# Patient Record
Sex: MALE | Race: WHITE | NOT HISPANIC OR LATINO | Employment: OTHER | URBAN - NONMETROPOLITAN AREA
[De-identification: names, ages, dates, MRNs, and addresses within clinical notes are randomized per-mention and may not be internally consistent; named-entity substitution may affect disease eponyms.]

---

## 2020-01-24 ENCOUNTER — PROCEDURE VISIT (OUTPATIENT)
Dept: UROLOGY | Facility: CLINIC | Age: 75
End: 2020-01-24

## 2020-01-24 DIAGNOSIS — Z85.51 HISTORY OF BLADDER CANCER: ICD-10-CM

## 2020-01-24 DIAGNOSIS — N40.0 BENIGN PROSTATIC HYPERPLASIA, UNSPECIFIED WHETHER LOWER URINARY TRACT SYMPTOMS PRESENT: Primary | ICD-10-CM

## 2020-01-24 LAB
BILIRUB BLD-MCNC: NEGATIVE MG/DL
CLARITY, POC: CLEAR
COLOR UR: YELLOW
GLUCOSE UR STRIP-MCNC: NEGATIVE MG/DL
KETONES UR QL: NEGATIVE
LEUKOCYTE EST, POC: NEGATIVE
NITRITE UR-MCNC: NEGATIVE MG/ML
PH UR: 5.5 [PH] (ref 5–8)
PROT UR STRIP-MCNC: NEGATIVE MG/DL
RBC # UR STRIP: NEGATIVE /UL
SP GR UR: 1.02 (ref 1–1.03)
UROBILINOGEN UR QL: NORMAL

## 2020-01-24 PROCEDURE — 81002 URINALYSIS NONAUTO W/O SCOPE: CPT | Performed by: UROLOGY

## 2020-01-24 PROCEDURE — 52000 CYSTOURETHROSCOPY: CPT | Performed by: UROLOGY

## 2020-01-24 NOTE — PROGRESS NOTES
Pre- operative diagnosis:  Bladder cancer surveillance.  Patient known to me from [facility] for history of low-grade superficial TCC bladder dating back to 2011.  Last TURBT February 2019 at time of TURP.  He is voiding well off prostate meds.  He presents today for 6-month surveillance cystoscopy.    Post operative diagnosis:  No recurrent bladder tumor    Procedure:  The patient was prepped and draped in a normal sterile fashion.  The urethra was anesthetized with 2% lidocaine jelly.  A flexible cystoscope was introduced per urethra.      Urethra:  Normal    Bladder:  There is no evidence of a stone, foreign body or mass within the bladder.  The bladder is minimally trabeculated.  The bladder neck is without contracture., Normal mucosa, Bladder stone and No bladder tumor    Ureteral orifices:  Normal position bilaterally and Clear efflux bilaterally    Prostate:  Well resected prostatic urethra     Patient tolerated the procedure well    Complications: none    Blood loss: minimal    Follow up:    Routine follow up-surveillance cystoscopy in the clinic in 6 months or sooner as needed.

## 2020-07-27 ENCOUNTER — PROCEDURE VISIT (OUTPATIENT)
Dept: UROLOGY | Facility: CLINIC | Age: 75
End: 2020-07-27

## 2020-07-27 DIAGNOSIS — N40.0 BENIGN PROSTATIC HYPERPLASIA, UNSPECIFIED WHETHER LOWER URINARY TRACT SYMPTOMS PRESENT: Primary | ICD-10-CM

## 2020-07-27 LAB
BILIRUB BLD-MCNC: NEGATIVE MG/DL
CLARITY, POC: CLEAR
COLOR UR: YELLOW
GLUCOSE UR STRIP-MCNC: NEGATIVE MG/DL
KETONES UR QL: NEGATIVE
LEUKOCYTE EST, POC: NEGATIVE
NITRITE UR-MCNC: NEGATIVE MG/ML
PH UR: 6 [PH] (ref 5–8)
PROT UR STRIP-MCNC: ABNORMAL MG/DL
RBC # UR STRIP: NEGATIVE /UL
SP GR UR: 1.02 (ref 1–1.03)
UROBILINOGEN UR QL: NORMAL

## 2020-07-27 PROCEDURE — 81003 URINALYSIS AUTO W/O SCOPE: CPT | Performed by: UROLOGY

## 2020-07-27 PROCEDURE — 52000 CYSTOURETHROSCOPY: CPT | Performed by: UROLOGY

## 2020-07-27 NOTE — PROGRESS NOTES
Pre- operative diagnosis:  Bladder cancer surveillance.  History of low-grade superficial TCC bladder dating back to 2011.  Last TURBT February 2019 at time of TURP.  He is voiding well off prostate meds.  He presents today for 6-month surveillance cystoscopy    Post operative diagnosis:  No recurrent bladder tumor    Procedure:  The patient was prepped and draped in a normal sterile fashion.  The urethra was anesthetized with 2% lidocaine jelly.  A flexible cystoscope was introduced per urethra.      Urethra:  Normal    Bladder:  Normal mucosa, No bladder tumor and No bladder neck contracture    Ureteral orifices:  Normal position bilaterally and Clear efflux bilaterally    Prostate:  Well resected prostatic urethra     Patient tolerated the procedure well    Complications: none    Blood loss: minimal    Follow up:    Routine follow up-office cystoscopy in 6 to 12 months for surveillance.      This document has been signed by JOANNA Romeo MD on July 27, 2020 12:04

## 2021-03-01 ENCOUNTER — PROCEDURE VISIT (OUTPATIENT)
Dept: UROLOGY | Facility: CLINIC | Age: 76
End: 2021-03-01

## 2021-03-01 DIAGNOSIS — N40.0 BENIGN PROSTATIC HYPERPLASIA, UNSPECIFIED WHETHER LOWER URINARY TRACT SYMPTOMS PRESENT: Primary | ICD-10-CM

## 2021-03-01 DIAGNOSIS — N52.1 ERECTILE DYSFUNCTION DUE TO DISEASES CLASSIFIED ELSEWHERE: ICD-10-CM

## 2021-03-01 LAB
BILIRUB BLD-MCNC: NEGATIVE MG/DL
CLARITY, POC: CLEAR
COLOR UR: YELLOW
GLUCOSE UR STRIP-MCNC: ABNORMAL MG/DL
KETONES UR QL: NEGATIVE
LEUKOCYTE EST, POC: NEGATIVE
NITRITE UR-MCNC: NEGATIVE MG/ML
PH UR: 6 [PH] (ref 5–8)
PROT UR STRIP-MCNC: ABNORMAL MG/DL
RBC # UR STRIP: NEGATIVE /UL
SP GR UR: 1.02 (ref 1–1.03)
UROBILINOGEN UR QL: NORMAL

## 2021-03-01 PROCEDURE — 52000 CYSTOURETHROSCOPY: CPT | Performed by: UROLOGY

## 2021-03-01 PROCEDURE — 81003 URINALYSIS AUTO W/O SCOPE: CPT | Performed by: UROLOGY

## 2021-03-01 RX ORDER — SILDENAFIL CITRATE 20 MG/1
TABLET ORAL
Qty: 20 TABLET | Refills: 11 | Status: SHIPPED | OUTPATIENT
Start: 2021-03-01

## 2021-03-01 NOTE — PROGRESS NOTES
Pre- operative diagnosis:  Bladder cancer surveillance.  History of low-grade superficial TCC bladder dating back to 2011.  Last TURBT February 2019 at time of TURP.  He is voiding well off prostate meds.      Post operative diagnosis:  No recurrent bladder tumor    Procedure:  The patient was prepped and draped in a normal sterile fashion.  The urethra was anesthetized with 2% lidocaine jelly.  A flexible cystoscope was introduced per urethra.      Urethra:  Normal    Bladder:  Normal mucosa, No bladder tumor and No bladder neck contracture    Ureteral orifices:  Normal position bilaterally and Clear efflux bilaterally    Prostate:  Well resected prostatic urethra     Patient tolerated the procedure well    Complications: none    Blood loss: minimal    Follow up:    Routine follow up-1 year for surveillance cystoscopy.    We would like a trial of PDE inhibitor therapy for his erectile dysfunction.  He will follow-up with me sooner as needed to discuss other treatments for his ED.      This document has been signed by JOANNA Romeo MD on March 1, 2021 13:00 CST

## 2022-03-02 ENCOUNTER — PROCEDURE VISIT (OUTPATIENT)
Dept: UROLOGY | Facility: CLINIC | Age: 77
End: 2022-03-02

## 2022-03-02 DIAGNOSIS — C67.3 CANCER OF ANTERIOR WALL OF URINARY BLADDER: Primary | ICD-10-CM

## 2022-03-02 DIAGNOSIS — N40.0 BENIGN PROSTATIC HYPERPLASIA, UNSPECIFIED WHETHER LOWER URINARY TRACT SYMPTOMS PRESENT: ICD-10-CM

## 2022-03-02 LAB
BILIRUB BLD-MCNC: ABNORMAL MG/DL
CLARITY, POC: CLEAR
COLOR UR: ABNORMAL
GLUCOSE UR STRIP-MCNC: ABNORMAL MG/DL
KETONES UR QL: ABNORMAL
LEUKOCYTE EST, POC: NEGATIVE
NITRITE UR-MCNC: NEGATIVE MG/ML
PH UR: 5 [PH] (ref 5–8)
PROT UR STRIP-MCNC: ABNORMAL MG/DL
RBC # UR STRIP: NEGATIVE /UL
SP GR UR: 1.02 (ref 1–1.03)
UROBILINOGEN UR QL: NORMAL

## 2022-03-02 PROCEDURE — 81001 URINALYSIS AUTO W/SCOPE: CPT | Performed by: UROLOGY

## 2022-03-02 PROCEDURE — 52000 CYSTOURETHROSCOPY: CPT | Performed by: UROLOGY

## 2022-03-03 NOTE — PROGRESS NOTES
Pre- operative diagnosis:  Bladder cancer surveillance.  History of low-grade superficial TCC bladder dating back to 2011.  Last TURBT February 2019 at time of TURP.  He is voiding well off prostate meds but is having some recurrent issues with dribbling.      Post operative diagnosis:  Bladder Tumor    Procedure:  The patient was prepped and draped in a normal sterile fashion.  The urethra was anesthetized with 2% lidocaine jelly.  A flexible cystoscope was introduced per urethra.      Urethra:  Normal    Bladder:  No bladder tumor.  1 cm papillary bladder tumor at bladder neck    Ureteral orifices:  Normal position bilaterally and Clear efflux bilaterally    Prostate:  Well resected prostatic urethra     Patient tolerated the procedure well    Complications: none    Blood loss: minimal    Follow up:    Schedule for OR  TURBT, bilateral retrograde pyelography, intravesical gemcitabine on 3/23/2022.  We discussed risks of the procedure including but not limited to infection, bleeding, need for additional procedures, persistent/recurrent cancer, injury to urethra and/or bladder, need for urethral catheter, complications of anesthesia.  Patient voices understanding and provide informed consent to proceed.      This document has been signed by JOANNA Romeo MD on March 2, 2022 18:51 CST

## 2022-03-25 ENCOUNTER — TELEPHONE (OUTPATIENT)
Dept: UROLOGY | Facility: CLINIC | Age: 77
End: 2022-03-25

## 2022-03-25 ENCOUNTER — LAB (OUTPATIENT)
Dept: LAB | Facility: HOSPITAL | Age: 77
End: 2022-03-25

## 2022-03-25 ENCOUNTER — PRE-ADMISSION TESTING (OUTPATIENT)
Dept: PREADMISSION TESTING | Facility: HOSPITAL | Age: 77
End: 2022-03-25

## 2022-03-25 VITALS
SYSTOLIC BLOOD PRESSURE: 185 MMHG | RESPIRATION RATE: 20 BRPM | BODY MASS INDEX: 34.08 KG/M2 | OXYGEN SATURATION: 96 % | HEART RATE: 81 BPM | HEIGHT: 67 IN | WEIGHT: 217.15 LBS | DIASTOLIC BLOOD PRESSURE: 81 MMHG

## 2022-03-25 DIAGNOSIS — C67.3 CANCER OF ANTERIOR WALL OF URINARY BLADDER: ICD-10-CM

## 2022-03-25 LAB — SARS-COV-2 ORF1AB RESP QL NAA+PROBE: NOT DETECTED

## 2022-03-25 PROCEDURE — 85027 COMPLETE CBC AUTOMATED: CPT | Performed by: UROLOGY

## 2022-03-25 PROCEDURE — U0004 COV-19 TEST NON-CDC HGH THRU: HCPCS

## 2022-03-25 PROCEDURE — U0005 INFEC AGEN DETEC AMPLI PROBE: HCPCS

## 2022-03-25 PROCEDURE — 81003 URINALYSIS AUTO W/O SCOPE: CPT | Performed by: UROLOGY

## 2022-03-25 PROCEDURE — 80048 BASIC METABOLIC PNL TOTAL CA: CPT | Performed by: UROLOGY

## 2022-03-25 PROCEDURE — 93005 ELECTROCARDIOGRAM TRACING: CPT | Performed by: UROLOGY

## 2022-03-25 PROCEDURE — C9803 HOPD COVID-19 SPEC COLLECT: HCPCS

## 2022-03-25 PROCEDURE — 93010 ELECTROCARDIOGRAM REPORT: CPT | Performed by: INTERNAL MEDICINE

## 2022-03-25 RX ORDER — LISINOPRIL 40 MG/1
40 TABLET ORAL DAILY
COMMUNITY

## 2022-03-25 RX ORDER — COLCHICINE 0.6 MG/1
0.6 CAPSULE ORAL AS NEEDED
COMMUNITY

## 2022-03-25 RX ORDER — CLOPIDOGREL BISULFATE 75 MG/1
75 TABLET ORAL DAILY
COMMUNITY
End: 2022-03-28 | Stop reason: HOSPADM

## 2022-03-25 RX ORDER — PANTOPRAZOLE SODIUM 40 MG/1
40 TABLET, DELAYED RELEASE ORAL DAILY
COMMUNITY

## 2022-03-25 RX ORDER — LANOLIN ALCOHOL/MO/W.PET/CERES
50 CREAM (GRAM) TOPICAL DAILY
COMMUNITY

## 2022-03-25 RX ORDER — FOLIC ACID 1 MG/1
1 TABLET ORAL DAILY
COMMUNITY

## 2022-03-25 RX ORDER — FEBUXOSTAT 40 MG/1
40 TABLET, FILM COATED ORAL DAILY
COMMUNITY

## 2022-03-25 RX ORDER — LANOLIN ALCOHOL/MO/W.PET/CERES
1000 CREAM (GRAM) TOPICAL DAILY
COMMUNITY

## 2022-03-25 RX ORDER — GABAPENTIN 600 MG/1
600 TABLET ORAL DAILY
COMMUNITY

## 2022-03-25 RX ORDER — ATORVASTATIN CALCIUM 20 MG/1
20 TABLET, FILM COATED ORAL DAILY
COMMUNITY

## 2022-03-25 RX ORDER — GLIPIZIDE 5 MG/1
5 TABLET ORAL
COMMUNITY

## 2022-03-25 NOTE — DISCHARGE INSTRUCTIONS
Before you come to the hospital      Do not eat, drink, smoke, or chew gum after midnight the night before surgery. This includes no mints.    Arrival time: AS DIRECTED BY OFFICE     Only one family member or friend will be allowed per patient      YOU MAY TAKE THE FOLLOWING MEDICATION(S) THE MORNING OF SURGERY WITH A SIP OF WATER: GABAPENTIN    DO NOT TAKE LISINOPRIL 24 HOURS PRIOR TO  SURGERY         ALL OTHER HOME MEDICATION CHECK WITH YOUR PHYSICIAN                            (especially if you are taking diabetes medicines or blood thinners)     Do not take any Erectile Dysfunction medications (EX: CIALIS, VIAGRA) 24 hours prior to surgery      If you were given and instructed to use a germ- killing soap, use as directed the night before surgery and the morning of surgery before coming to the hospital.                 MANAGING PAIN AFTER SURGERY    We know you are probably wondering what your pain will be like after surgery.  Following surgery it is unrealistic to expect you will not have pain.   Pain is how our bodies let us know that something is wrong or cautions us to be careful.  That said, our goal is to make your pain tolerable.    Methods we may use to treat your pain include (oral or IV medications, PCAs, epidurals, nerve blocks, etc.)   While some procedures require IV pain medications for a short time after surgery, transitioning to pain medications by mouth allows for better management of pain.   Your nurse will encourage you to take oral pain medications whenever possible.  IV medications work almost immediately, but only last a short while.  Taking medications by mouth allows for a more constant level of medication in your blood stream for a longer period of time.      Once your pain is out of control it is harder to get back under control.  It is important you are aware when your next dose of pain medication is due.  If you are admitted, your nurse may write the time of your next dose on the  white board in your room to help you remember.      We are interested in your pain and encourage you to inform us about aggravating factors during your visit.   Many times a simple repositioning every few hours can make a big difference.    If your physician says it is okay, do not let your pain prevent you from getting out of bed. Be sure to call your nurse for assistance prior to getting up so you do not fall.      Before surgery, please decide your tolerable pain goal.  These faces help describe the pain ratings we use on a 0-10 scale.   Be prepared to tell us your goal and whether or not you take pain or anxiety medications at home.

## 2022-03-26 LAB
QT INTERVAL: 412 MS
QTC INTERVAL: 428 MS

## 2022-03-28 ENCOUNTER — ANESTHESIA (OUTPATIENT)
Dept: PERIOP | Facility: HOSPITAL | Age: 77
End: 2022-03-28

## 2022-03-28 ENCOUNTER — HOSPITAL ENCOUNTER (OUTPATIENT)
Facility: HOSPITAL | Age: 77
Setting detail: HOSPITAL OUTPATIENT SURGERY
Discharge: HOME | End: 2022-03-28
Attending: UROLOGY | Admitting: UROLOGY

## 2022-03-28 ENCOUNTER — APPOINTMENT (OUTPATIENT)
Dept: GENERAL RADIOLOGY | Facility: HOSPITAL | Age: 77
End: 2022-03-28

## 2022-03-28 ENCOUNTER — ANESTHESIA EVENT (OUTPATIENT)
Dept: PERIOP | Facility: HOSPITAL | Age: 77
End: 2022-03-28

## 2022-03-28 VITALS
SYSTOLIC BLOOD PRESSURE: 177 MMHG | RESPIRATION RATE: 16 BRPM | HEART RATE: 65 BPM | DIASTOLIC BLOOD PRESSURE: 89 MMHG | OXYGEN SATURATION: 93 % | TEMPERATURE: 97.3 F

## 2022-03-28 DIAGNOSIS — C67.3 CANCER OF ANTERIOR WALL OF URINARY BLADDER: ICD-10-CM

## 2022-03-28 LAB
GLUCOSE BLDC GLUCOMTR-MCNC: 183 MG/DL (ref 70–130)
GLUCOSE BLDC GLUCOMTR-MCNC: 197 MG/DL (ref 70–130)

## 2022-03-28 PROCEDURE — 88307 TISSUE EXAM BY PATHOLOGIST: CPT | Performed by: UROLOGY

## 2022-03-28 PROCEDURE — 74420 UROGRAPHY RTRGR +-KUB: CPT | Performed by: UROLOGY

## 2022-03-28 PROCEDURE — 25010000002 FENTANYL CITRATE (PF) 100 MCG/2ML SOLUTION: Performed by: NURSE ANESTHETIST, CERTIFIED REGISTERED

## 2022-03-28 PROCEDURE — 25010000002 IOPAMIDOL 61 % SOLUTION: Performed by: UROLOGY

## 2022-03-28 PROCEDURE — 25010000002 PROPOFOL 10 MG/ML EMULSION: Performed by: NURSE ANESTHETIST, CERTIFIED REGISTERED

## 2022-03-28 PROCEDURE — 52234 CYSTOSCOPY AND TREATMENT: CPT | Performed by: UROLOGY

## 2022-03-28 PROCEDURE — 51720 TREATMENT OF BLADDER LESION: CPT | Performed by: UROLOGY

## 2022-03-28 PROCEDURE — 25010000002 LEVOFLOXACIN PER 250 MG: Performed by: UROLOGY

## 2022-03-28 PROCEDURE — C1758 CATHETER, URETERAL: HCPCS | Performed by: UROLOGY

## 2022-03-28 PROCEDURE — 52005 CYSTO W/URTRL CATHJ: CPT | Performed by: UROLOGY

## 2022-03-28 PROCEDURE — 74420 UROGRAPHY RTRGR +-KUB: CPT

## 2022-03-28 PROCEDURE — 82962 GLUCOSE BLOOD TEST: CPT

## 2022-03-28 PROCEDURE — 25010000002 ONDANSETRON PER 1 MG: Performed by: NURSE ANESTHETIST, CERTIFIED REGISTERED

## 2022-03-28 RX ORDER — PROPOFOL 10 MG/ML
VIAL (ML) INTRAVENOUS AS NEEDED
Status: DISCONTINUED | OUTPATIENT
Start: 2022-03-28 | End: 2022-03-28 | Stop reason: SURG

## 2022-03-28 RX ORDER — DEXTROSE MONOHYDRATE 25 G/50ML
12.5 INJECTION, SOLUTION INTRAVENOUS AS NEEDED
Status: DISCONTINUED | OUTPATIENT
Start: 2022-03-28 | End: 2022-03-28 | Stop reason: HOSPADM

## 2022-03-28 RX ORDER — FLUMAZENIL 0.1 MG/ML
0.2 INJECTION INTRAVENOUS AS NEEDED
Status: DISCONTINUED | OUTPATIENT
Start: 2022-03-28 | End: 2022-03-28 | Stop reason: HOSPADM

## 2022-03-28 RX ORDER — MAGNESIUM HYDROXIDE 1200 MG/15ML
LIQUID ORAL AS NEEDED
Status: DISCONTINUED | OUTPATIENT
Start: 2022-03-28 | End: 2022-03-28 | Stop reason: HOSPADM

## 2022-03-28 RX ORDER — DOCUSATE SODIUM 100 MG/1
100 CAPSULE, LIQUID FILLED ORAL 2 TIMES DAILY
Qty: 60 CAPSULE | Refills: 1 | Status: SHIPPED | OUTPATIENT
Start: 2022-03-28

## 2022-03-28 RX ORDER — LIDOCAINE HYDROCHLORIDE 10 MG/ML
0.5 INJECTION, SOLUTION EPIDURAL; INFILTRATION; INTRACAUDAL; PERINEURAL ONCE AS NEEDED
Status: DISCONTINUED | OUTPATIENT
Start: 2022-03-28 | End: 2022-03-28 | Stop reason: HOSPADM

## 2022-03-28 RX ORDER — SODIUM CHLORIDE, SODIUM LACTATE, POTASSIUM CHLORIDE, CALCIUM CHLORIDE 600; 310; 30; 20 MG/100ML; MG/100ML; MG/100ML; MG/100ML
1000 INJECTION, SOLUTION INTRAVENOUS CONTINUOUS
Status: DISCONTINUED | OUTPATIENT
Start: 2022-03-28 | End: 2022-03-28 | Stop reason: HOSPADM

## 2022-03-28 RX ORDER — FENTANYL CITRATE 50 UG/ML
INJECTION, SOLUTION INTRAMUSCULAR; INTRAVENOUS AS NEEDED
Status: DISCONTINUED | OUTPATIENT
Start: 2022-03-28 | End: 2022-03-28 | Stop reason: SURG

## 2022-03-28 RX ORDER — SULFAMETHOXAZOLE AND TRIMETHOPRIM 800; 160 MG/1; MG/1
1 TABLET ORAL 2 TIMES DAILY
Qty: 6 TABLET | Refills: 0 | Status: SHIPPED | OUTPATIENT
Start: 2022-03-28 | End: 2022-03-31

## 2022-03-28 RX ORDER — ONDANSETRON 4 MG/1
4 TABLET, ORALLY DISINTEGRATING ORAL EVERY 6 HOURS PRN
Qty: 6 TABLET | Refills: 1 | Status: SHIPPED | OUTPATIENT
Start: 2022-03-28

## 2022-03-28 RX ORDER — LIDOCAINE HYDROCHLORIDE 20 MG/ML
INJECTION, SOLUTION EPIDURAL; INFILTRATION; INTRACAUDAL; PERINEURAL AS NEEDED
Status: DISCONTINUED | OUTPATIENT
Start: 2022-03-28 | End: 2022-03-28 | Stop reason: SURG

## 2022-03-28 RX ORDER — FENTANYL CITRATE 50 UG/ML
25 INJECTION, SOLUTION INTRAMUSCULAR; INTRAVENOUS
Status: DISCONTINUED | OUTPATIENT
Start: 2022-03-28 | End: 2022-03-28 | Stop reason: HOSPADM

## 2022-03-28 RX ORDER — LEVOFLOXACIN 5 MG/ML
500 INJECTION, SOLUTION INTRAVENOUS ONCE
Status: COMPLETED | OUTPATIENT
Start: 2022-03-28 | End: 2022-03-28

## 2022-03-28 RX ORDER — OXYBUTYNIN CHLORIDE 5 MG/1
5 TABLET ORAL EVERY 8 HOURS PRN
Qty: 20 TABLET | Refills: 1 | Status: SHIPPED | OUTPATIENT
Start: 2022-03-28

## 2022-03-28 RX ORDER — SODIUM CHLORIDE 0.9 % (FLUSH) 0.9 %
10 SYRINGE (ML) INJECTION EVERY 12 HOURS SCHEDULED
Status: DISCONTINUED | OUTPATIENT
Start: 2022-03-28 | End: 2022-03-28 | Stop reason: HOSPADM

## 2022-03-28 RX ORDER — IBUPROFEN 600 MG/1
600 TABLET ORAL ONCE AS NEEDED
Status: DISCONTINUED | OUTPATIENT
Start: 2022-03-28 | End: 2022-03-28 | Stop reason: HOSPADM

## 2022-03-28 RX ORDER — DROPERIDOL 2.5 MG/ML
0.62 INJECTION, SOLUTION INTRAMUSCULAR; INTRAVENOUS ONCE AS NEEDED
Status: DISCONTINUED | OUTPATIENT
Start: 2022-03-28 | End: 2022-03-28 | Stop reason: HOSPADM

## 2022-03-28 RX ORDER — LABETALOL HYDROCHLORIDE 5 MG/ML
5 INJECTION, SOLUTION INTRAVENOUS
Status: DISCONTINUED | OUTPATIENT
Start: 2022-03-28 | End: 2022-03-28 | Stop reason: HOSPADM

## 2022-03-28 RX ORDER — ONDANSETRON 2 MG/ML
4 INJECTION INTRAMUSCULAR; INTRAVENOUS ONCE AS NEEDED
Status: DISCONTINUED | OUTPATIENT
Start: 2022-03-28 | End: 2022-03-28 | Stop reason: HOSPADM

## 2022-03-28 RX ORDER — TRAMADOL HYDROCHLORIDE 50 MG/1
50 TABLET ORAL EVERY 8 HOURS PRN
Qty: 6 TABLET | Refills: 0 | Status: SHIPPED | OUTPATIENT
Start: 2022-03-28

## 2022-03-28 RX ORDER — PHENAZOPYRIDINE HYDROCHLORIDE 100 MG/1
100 TABLET, FILM COATED ORAL 3 TIMES DAILY PRN
Qty: 20 TABLET | Refills: 1 | Status: SHIPPED | OUTPATIENT
Start: 2022-03-28

## 2022-03-28 RX ORDER — SODIUM CHLORIDE, SODIUM LACTATE, POTASSIUM CHLORIDE, CALCIUM CHLORIDE 600; 310; 30; 20 MG/100ML; MG/100ML; MG/100ML; MG/100ML
9 INJECTION, SOLUTION INTRAVENOUS CONTINUOUS
Status: DISCONTINUED | OUTPATIENT
Start: 2022-03-28 | End: 2022-03-28 | Stop reason: HOSPADM

## 2022-03-28 RX ORDER — OXYCODONE AND ACETAMINOPHEN 7.5; 325 MG/1; MG/1
2 TABLET ORAL EVERY 4 HOURS PRN
Status: DISCONTINUED | OUTPATIENT
Start: 2022-03-28 | End: 2022-03-28 | Stop reason: HOSPADM

## 2022-03-28 RX ORDER — SODIUM CHLORIDE 0.9 % (FLUSH) 0.9 %
3 SYRINGE (ML) INJECTION AS NEEDED
Status: DISCONTINUED | OUTPATIENT
Start: 2022-03-28 | End: 2022-03-28 | Stop reason: HOSPADM

## 2022-03-28 RX ORDER — NALOXONE HCL 0.4 MG/ML
0.4 VIAL (ML) INJECTION AS NEEDED
Status: DISCONTINUED | OUTPATIENT
Start: 2022-03-28 | End: 2022-03-28 | Stop reason: HOSPADM

## 2022-03-28 RX ORDER — OXYCODONE AND ACETAMINOPHEN 10; 325 MG/1; MG/1
1 TABLET ORAL ONCE AS NEEDED
Status: DISCONTINUED | OUTPATIENT
Start: 2022-03-28 | End: 2022-03-28 | Stop reason: HOSPADM

## 2022-03-28 RX ORDER — ROCURONIUM BROMIDE 10 MG/ML
INJECTION, SOLUTION INTRAVENOUS AS NEEDED
Status: DISCONTINUED | OUTPATIENT
Start: 2022-03-28 | End: 2022-03-28 | Stop reason: SURG

## 2022-03-28 RX ORDER — SODIUM CHLORIDE 0.9 % (FLUSH) 0.9 %
10 SYRINGE (ML) INJECTION AS NEEDED
Status: DISCONTINUED | OUTPATIENT
Start: 2022-03-28 | End: 2022-03-28 | Stop reason: HOSPADM

## 2022-03-28 RX ORDER — ATROPA BELLADONNA AND OPIUM 16.2; 3 MG/1; MG/1
SUPPOSITORY RECTAL
Status: DISCONTINUED
Start: 2022-03-28 | End: 2022-03-28 | Stop reason: HOSPADM

## 2022-03-28 RX ORDER — ONDANSETRON 2 MG/ML
INJECTION INTRAMUSCULAR; INTRAVENOUS AS NEEDED
Status: DISCONTINUED | OUTPATIENT
Start: 2022-03-28 | End: 2022-03-28 | Stop reason: SURG

## 2022-03-28 RX ORDER — TRAMADOL HYDROCHLORIDE 50 MG/1
50 TABLET ORAL ONCE AS NEEDED
Status: DISCONTINUED | OUTPATIENT
Start: 2022-03-28 | End: 2022-03-28 | Stop reason: HOSPADM

## 2022-03-28 RX ORDER — NEOSTIGMINE METHYLSULFATE 5 MG/5 ML
SYRINGE (ML) INTRAVENOUS AS NEEDED
Status: DISCONTINUED | OUTPATIENT
Start: 2022-03-28 | End: 2022-03-28 | Stop reason: SURG

## 2022-03-28 RX ADMIN — VASOPRESSIN 1 ML: 20 INJECTION INTRAVENOUS at 13:18

## 2022-03-28 RX ADMIN — SODIUM CHLORIDE, POTASSIUM CHLORIDE, SODIUM LACTATE AND CALCIUM CHLORIDE 1000 ML: 600; 310; 30; 20 INJECTION, SOLUTION INTRAVENOUS at 10:48

## 2022-03-28 RX ADMIN — ROCURONIUM BROMIDE 30 MG: 10 SOLUTION INTRAVENOUS at 13:10

## 2022-03-28 RX ADMIN — LIDOCAINE HYDROCHLORIDE 100 MG: 20 INJECTION, SOLUTION EPIDURAL; INFILTRATION; INTRACAUDAL; PERINEURAL at 13:10

## 2022-03-28 RX ADMIN — PROPOFOL 150 MG: 10 INJECTION, EMULSION INTRAVENOUS at 13:10

## 2022-03-28 RX ADMIN — FENTANYL CITRATE 50 MCG: 50 INJECTION, SOLUTION INTRAMUSCULAR; INTRAVENOUS at 13:14

## 2022-03-28 RX ADMIN — LEVOFLOXACIN 500 MG: 5 INJECTION, SOLUTION INTRAVENOUS at 13:05

## 2022-03-28 RX ADMIN — LEVOFLOXACIN 500 MG: 5 INJECTION, SOLUTION INTRAVENOUS at 12:29

## 2022-03-28 RX ADMIN — VASOPRESSIN 1 ML: 20 INJECTION INTRAVENOUS at 13:30

## 2022-03-28 RX ADMIN — VASOPRESSIN 1 ML: 20 INJECTION INTRAVENOUS at 13:22

## 2022-03-28 RX ADMIN — Medication 3 MG: at 13:35

## 2022-03-28 RX ADMIN — OXYCODONE HYDROCHLORIDE AND ACETAMINOPHEN 2 TABLET: 7.5; 325 TABLET ORAL at 13:56

## 2022-03-28 RX ADMIN — GLYCOPYRROLATE 0.4 MG: 0.2 INJECTION, SOLUTION INTRAMUSCULAR; INTRAVENOUS at 13:35

## 2022-03-28 RX ADMIN — ONDANSETRON 4 MG: 2 INJECTION INTRAMUSCULAR; INTRAVENOUS at 13:35

## 2022-03-28 RX ADMIN — FENTANYL CITRATE 50 MCG: 50 INJECTION, SOLUTION INTRAMUSCULAR; INTRAVENOUS at 13:10

## 2022-03-28 NOTE — OP NOTE
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 Cystoscopy: 1 cm papillary bladder tumor anterior bladder neck completely resected  #2. Interpretation of retrograde pyelogram(s): The retrograde pyelogram(s) interpretation(s) is/are dictated per separate report under \"Brief op note\" document type.        Procedure details:  The patient is identified in the preoperative holding area.  The informed consent process had been completed In the office documented by my last progress note that included a discussion of the risks of this procedure, alternative management options, and the potential benefits of this procedure.  The patient voiced a good recollection of that discussion.  The patient voiced no additional questions.     The patient is taken to the cystoscopic suite and given effective general anesthesia. The patient is then placed in theBody position: dorsal lithotomy position with care being placed to appropriately pad the patient to avoid excessive compression with the Robin stirrups, especially laterally to the leg on the peroneal nerve.  The patient is then prepped and draped in the usual sterile fashion.  At this point appropriate timeout protocol was observed.    A 23 Japanese cystoscope sheath with 30° lens is inserted into " the bladder.  I also used a 70° lens to carry out a panendoscopic inspection of the urinary bladder.  Findings are described above.     Bilateral retrograde ureteropyelograms are then carried out with a 5 Georgian cone tip catheter using diluted contrast with spot fluoroscopic images taken. Findings are described per separate report.     The previously described bladder tumor(s) as described is then addressed.  I removed the cystoscope and placed a 26 Congolese continuous-flow resectoscope with visual obturator in the bladder.  I then removed the obturator and placed the resecting element with loop electrode.  I then performed transurethral resection of bladder tumor of the 1 cm papillary bladder neck tumor along the anterior bladder neck.  Hemostasis was obtained electrocautery.  Bladder tumor chips were removed and passed off the table as a specimen.  Hemostasis appeared intact.  I then removed the resectoscope and placed a 20 Congolese three-way Al catheter bladder with 10 cc of sterile water in the balloon.  Bladder was drained and then I placed a solution of gemcitabine 2000 mg into the bladder for immediate intravesical chemotherapy.  The catheter was plugged for 1 hour dwell time.  Patient was awakened from anesthesia and taken to the recovery in stable condition.  .     Estimated Blood Loss: Minimal    Specimens:                Specimens     ID Source Type Tests Collected By Collected At Frozen?    A Urinary Bladder Tissue · TISSUE PATHOLOGY EXAM   Farshad Romeo MD 3/28/22 1332     Description: bladder tumor     This specimen was not marked as sent.            Drains:   Urethral Catheter Latex 20 Fr. (Active)   Daily Indications Placement by  physician 03/28/22 1343   Collection Container Other (Comment) 03/28/22 1343   Securement Method Tape 03/28/22 1343       Complications: none    Plan: Patient will remove his Al catheter home tomorrow morning.  I will call him with his pathology result.    Farshad  Nigel Romeo MD     Date: 3/28/2022  Time: 14:00 CDT

## 2022-03-28 NOTE — ANESTHESIA PREPROCEDURE EVALUATION
Anesthesia Evaluation     Patient summary reviewed   NPO Solid Status: > 8 hours             Airway   Mallampati: II  Dental    (+) upper dentures and lower dentures    Pulmonary    (-) COPD, asthma, sleep apnea, not a smoker  Cardiovascular   Exercise tolerance: good (4-7 METS)    (+) hypertension, CAD, cardiac stents more than 12 months ago hyperlipidemia,   (-) pacemaker, past MI, angina      Neuro/Psych  (+) CVA,    (-) seizures, TIA  GI/Hepatic/Renal/Endo    (+) obesity,   renal disease CRI, diabetes mellitus,   (-) GERD, liver disease    Musculoskeletal     Abdominal    Substance History      OB/GYN          Other                        Anesthesia Plan    ASA 3     general     intravenous induction     Anesthetic plan, all risks, benefits, and alternatives have been provided, discussed and informed consent has been obtained with: patient.        CODE STATUS:

## 2022-03-28 NOTE — ANESTHESIA POSTPROCEDURE EVALUATION
Patient: Live Blackwood    Procedure Summary     Date: 03/28/22 Room / Location:  [facility] OR  /  [facility]    Anesthesia Start: 1305 Anesthesia Stop: 1344    Procedures:       CYSTOSCOPY TRANSURETHRAL RESECTION OF BLADDER TUMOR, BILATERAL RETROGRADE PYELOGRAPHY, INTRAVESICAL GEMCITABINE (N/A Bladder)      BILATERAL RETROGRADE PYELOGRAPHY (N/A Bladder)      INTRAVESICAL GEMCITABINE (N/A Bladder) Diagnosis:       Cancer of anterior wall of urinary bladder (HCC)      (Cancer of anterior wall of urinary bladder (HCC) [C67.3])    Surgeons: Farshad Romeo MD Provider: Tata Ortega CRNA    Anesthesia Type: general ASA Status: 3          Anesthesia Type: general    Vitals  Vitals Value Taken Time   /82 03/28/22 1418   Temp 97.3 °F (36.3 °C) 03/28/22 1418   Pulse 58 03/28/22 1420   Resp 11 03/28/22 1418   SpO2 92 % 03/28/22 1420   Vitals shown include unvalidated device data.        Post Anesthesia Care and Evaluation    Patient location during evaluation: PACU  Patient participation: complete - patient participated  Level of consciousness: awake and alert  Pain management: adequate  Airway patency: patent  Anesthetic complications: No anesthetic complications  PONV Status: none  Cardiovascular status: acceptable and hemodynamically stable  Respiratory status: acceptable  Hydration status: acceptable    Comments: Blood pressure 160/87, pulse 58, temperature 97.3 °F (36.3 °C), resp. rate 16, SpO2 94 %.    Patient discharged from PACU based upon Kenia score. Please see RN notes for further details

## 2022-03-28 NOTE — BRIEF OP NOTE
CYSTOSCOPY TRANSURETHRAL RESECTION OF BLADDER TUMOR, CYSTOSCOPY RETROGRADE PYELOGRAM, BLADDER MEDICINE INSTILLATION  Progress Note    Live Blackwood  3/28/2022    Pre-op Diagnosis:   Cancer of anterior wall of urinary bladder (HCC) [C67.3]       Post-Op Diagnosis Codes:     * Cancer of anterior wall of urinary bladder (HCC) [C67.3]    Procedure/CPT® Codes:        Procedure(s):  CYSTOSCOPY TRANSURETHRAL RESECTION OF BLADDER TUMOR (1CM), BILATERAL RETROGRADE PYELOGRAPHY  INTRAVESICAL GEMCITABINE      Surgeon(s):  Farshad Romeo MD    Anesthesia: General    Staff:   Circulator: Kristyn Akhtar RN  Scrub Person: Mila Mckeon; Yakelin Cummins         Estimated Blood Loss: minimal    Urine Voided: * No values recorded between 3/28/2022  1:05 PM and 3/28/2022  1:42 PM *    Specimens:                Specimens     ID Source Type Tests Collected By Collected At Frozen?    A Urinary Bladder Tissue · TISSUE PATHOLOGY EXAM   Farshad Romeo MD 3/28/22 1332     Description: bladder tumor     This specimen was not marked as sent.                Drains:   Urethral Catheter Latex 20 Fr. (Active)   Daily Indications Placement by  physician 03/28/22 1343   Collection Container Other (Comment) 03/28/22 1343   Securement Method Tape 03/28/22 1343       Findings: Normal bilateral retrograde pyelograms  1 cm papillary bladder tumor anterior bladder neck completely resected    Complications: None          Farshad Romeo MD     Date: 3/28/2022  Time: 13:59 CDT

## 2022-03-28 NOTE — ANESTHESIA PROCEDURE NOTES
Airway  Urgency: elective    Date/Time: 3/28/2022 1:11 PM  Airway not difficult    General Information and Staff    Patient location during procedure: OR  CRNA: Tata Ortega CRNA    Indications and Patient Condition  Indications for airway management: airway protection    Preoxygenated: yes  Mask difficulty assessment: 2 - vent by mask + OA or adjuvant +/- NMBA    Final Airway Details  Final airway type: endotracheal airway      Successful airway: ETT  Cuffed: yes   Successful intubation technique: direct laryngoscopy  Facilitating devices/methods: intubating stylet  Endotracheal tube insertion site: oral  Blade: Sanders  Blade size: 2  ETT size (mm): 7.5  Cormack-Lehane Classification: grade I - full view of glottis  Placement verified by: chest auscultation and capnometry   Cuff volume (mL): 8  Measured from: lips  ETT/EBT  to lips (cm): 22  Number of attempts at approach: 1  Assessment: lips, teeth, and gum same as pre-op and atraumatic intubation    Additional Comments  Atraumatic

## 2022-03-30 ENCOUNTER — TELEPHONE (OUTPATIENT)
Dept: UROLOGY | Facility: CLINIC | Age: 77
End: 2022-03-30

## 2022-03-30 LAB
CYTO UR: NORMAL
LAB AP CASE REPORT: NORMAL
PATH REPORT.FINAL DX SPEC: NORMAL
PATH REPORT.GROSS SPEC: NORMAL

## 2022-03-30 NOTE — TELEPHONE ENCOUNTER
----- Message from Farshad Romeo MD sent at 3/30/2022 11:02 AM CDT -----  They need to take his temperature and go to the closest ER or urgent care if it is >100.5    They can go to [place] closer to where they live..    ----- Message -----  From: Nena Borden MA  Sent: 3/30/2022  10:59 AM CDT  To: Farshad Romeo MD    Patients wife has called and stated that the patient had surgery on 3/28/2022 and is now running a fever however the wife doesn't know how high the fever is because she doesn't have a way to take his temp patients wife would like to know what you recommend I did tell her we really needed to get a thermometer so we would know exactly what his temp is .

## 2022-03-31 ENCOUNTER — TELEPHONE (OUTPATIENT)
Dept: UROLOGY | Facility: CLINIC | Age: 77
End: 2022-03-31

## 2022-03-31 NOTE — TELEPHONE ENCOUNTER
I received a call from [facility] in [place] regarding Mr. Blackwood presenting today with a fever to 103 postop day 3 status post TURBT.  They were working up for his fever and the hospitalist there wanted the ER to call me.  He had been sent home on Bactrim postoperatively.  CT scan apparently showed some bladder wall thickening.  I discussed that this could be a febrile prostatitis type picture but that he needs a fever work-up which can be done there in [place] with IV antibiotics pending urine culture results.  I let them know that if they are unable or unwilling to take care of them there, they will need to call the [facility] transfer Lissie if they are looking to transfer his care to [place].    ----- Message from Mila Joiner MA sent at 3/31/2022  1:44 PM CDT -----    ----- Message -----  From: Nena Borden MA  Sent: 3/31/2022   1:40 PM CDT  To: Mila Joiner MA    Patient had called yesterday and stated that they thought that he was running a fever and he had surgery with Dr. Romeo on Monday. Patients wife was informed that if patient started running a 100.5 to take him to nearest ER. [facility] had called and stated that the patient is currently there running a 103 Temp and that they would like to speak with Dr. Romeo

## 2022-03-31 NOTE — TELEPHONE ENCOUNTER
Nurse called from [facility] and stated that the patient is currently in the ER running a 103.0 fever. Per Dr. Romeo on 3/30/2022 patient was to go to local ER if he were to be running above 100.5 and could not get it down.

## 2022-03-31 NOTE — PROGRESS NOTES
I called and informed him and his spouse regarding his pathology report.  He should see me for a flex cystoscopy appointment in 6 months.  I will ask our office to mail him a copy of his pathology report.  He has having some low-grade temperature of 100.  He is on Bactrim prophylaxis postop.  He is going to  see Dr. Louis tomorrow morning to get checked out.

## 2022-04-04 ENCOUNTER — TELEPHONE (OUTPATIENT)
Dept: UROLOGY | Facility: CLINIC | Age: 77
End: 2022-04-04

## 2022-04-04 RX ORDER — ATROPA BELLADONNA AND OPIUM 16.2; 3 MG/1; MG/1
SUPPOSITORY RECTAL AS NEEDED
Status: DISCONTINUED | OUTPATIENT
Start: 2022-04-04 | End: 2022-04-04 | Stop reason: HOSPADM

## 2022-04-04 NOTE — TELEPHONE ENCOUNTER
"Patient's wife called and states \"patient developed an infection was in [facility] for two days\"  He had surgery with Dr. Romeo 03/28/22.  He does not have a fever at this time, they put him on Levosloxacin.  She would like a call to discuss follow up.  "

## 2022-04-04 NOTE — TELEPHONE ENCOUNTER
"Patient completing a 14-day course of Levaquin for fever after TURBT last week.  It is unclear whether cultures at [facility] grew any bacteria over the weekend.  I instructed him to complete his course of antibiotics and he will follow-up with Dr. Louis and with me as scheduled in October for surveillance cystoscopy or sooner as needed.      ----- Message from Genny De La Rosa MA sent at 4/4/2022 11:18 AM CDT -----  Received this from pt wife    Patient's wife called and states \"patient developed an infection was in [facility] for two days\"  He had surgery with Dr. Romeo 03/28/22.  He does not have a fever at this time, they put him on Levosloxacin.  She would like a call to discuss follow up.      "

## 2022-10-03 ENCOUNTER — PROCEDURE VISIT (OUTPATIENT)
Dept: UROLOGY | Facility: CLINIC | Age: 77
End: 2022-10-03

## 2022-10-03 DIAGNOSIS — C67.3 CANCER OF ANTERIOR WALL OF URINARY BLADDER: Primary | ICD-10-CM

## 2022-10-03 LAB
BILIRUB BLD-MCNC: NEGATIVE MG/DL
CLARITY, POC: CLEAR
COLOR UR: YELLOW
GLUCOSE UR STRIP-MCNC: NEGATIVE MG/DL
KETONES UR QL: NEGATIVE
LEUKOCYTE EST, POC: NEGATIVE
NITRITE UR-MCNC: NEGATIVE MG/ML
PH UR: 5 [PH] (ref 5–8)
PROT UR STRIP-MCNC: ABNORMAL MG/DL
RBC # UR STRIP: NEGATIVE /UL
SP GR UR: 1.02 (ref 1–1.03)
UROBILINOGEN UR QL: ABNORMAL

## 2022-10-03 PROCEDURE — 81001 URINALYSIS AUTO W/SCOPE: CPT | Performed by: UROLOGY

## 2022-10-03 PROCEDURE — 52000 CYSTOURETHROSCOPY: CPT | Performed by: UROLOGY

## 2022-10-03 NOTE — PROGRESS NOTES
Pre- operative diagnosis:  Bladder cancer surveillance.  History of low-grade superficial TCC bladder dating back to 2011.  He had tumor February 2019 at time of TURP.  Most recent tumor was PUNLMP March 2022, bilateral RPG normal at that time.    Post operative diagnosis:  No recurrent bladder tumor    Procedure:  The patient was prepped and draped in a normal sterile fashion.  The urethra was anesthetized with 2% lidocaine jelly.  A flexible cystoscope was introduced per urethra.      Urethra:  Normal    Bladder:  Normal mucosa, No bladder tumor and mild trabeculation    Ureteral orifices:  Normal position bilaterally and Clear efflux bilaterally    Prostate:  Well resected prostatic urethra     Patient tolerated the procedure well    Complications: none    Blood loss: minimal    Follow up:    Routine follow up-6 months for surveillance cystoscopy.      This document has been signed by JOANNA Romeo MD on October 3, 2022 17:01 CDT

## 2023-04-03 ENCOUNTER — PROCEDURE VISIT (OUTPATIENT)
Dept: UROLOGY | Facility: CLINIC | Age: 78
End: 2023-04-03
Payer: MEDICARE

## 2023-04-03 DIAGNOSIS — C67.3 CANCER OF ANTERIOR WALL OF URINARY BLADDER: Primary | ICD-10-CM

## 2023-04-03 LAB
BILIRUB BLD-MCNC: NEGATIVE MG/DL
CLARITY, POC: CLEAR
COLOR UR: YELLOW
GLUCOSE UR STRIP-MCNC: ABNORMAL MG/DL
KETONES UR QL: NEGATIVE
LEUKOCYTE EST, POC: NEGATIVE
NITRITE UR-MCNC: NEGATIVE MG/ML
PH UR: 6 [PH] (ref 5–8)
PROT UR STRIP-MCNC: ABNORMAL MG/DL
RBC # UR STRIP: NEGATIVE /UL
SP GR UR: 1.02 (ref 1–1.03)
UROBILINOGEN UR QL: ABNORMAL

## 2023-04-03 PROCEDURE — 81001 URINALYSIS AUTO W/SCOPE: CPT | Performed by: UROLOGY

## 2023-04-03 PROCEDURE — 52000 CYSTOURETHROSCOPY: CPT | Performed by: UROLOGY

## 2023-04-03 NOTE — PROGRESS NOTES
Pre- operative diagnosis:  Bladder cancer surveillance. History of low-grade superficial TCC bladder dating back to 2011.  He had tumor February 2019 at time of TURP.  Most recent tumor was PUNLMP March 2022, bilateral RPG normal at that time.    Post operative diagnosis:  No recurrent bladder tumor    Procedure:  The patient was prepped and draped in a normal sterile fashion.  The urethra was anesthetized with 2% lidocaine jelly.  A flexible cystoscope was introduced per urethra.      Urethra:  Normal    Bladder:  Normal mucosa, No bladder tumor and mild trabeculation    Ureteral orifices:  Normal position bilaterally and Clear efflux bilaterally    Prostate:  Well resected prostatic urethra     Patient tolerated the procedure well    Complications: none    Blood loss: minimal    Follow up:    Routine follow up-1 year for surveillance cystoscopy or sooner as needed        This document has been signed by JOANNA Romeo MD on April 3, 2023 13:09 CDT

## 2023-04-03 NOTE — LETTER
April 3, 2023     Jonah Louis MD  [address] [facility] [id number]    Patient: Live Blackwood   YOB: 1945   Date of Visit: 4/3/2023     Dear Dr. Missy MD:    Thank you for referring Live Blackwood to me for evaluation. Below are the relevant portions of my assessment and plan of care.    If you have questions, please do not hesitate to call me. I look forward to following Live along with you.         Sincerely,        Farshad Romeo MD        CC: No Recipients      Progress Notes:  Pre- operative diagnosis:  Bladder cancer surveillance. History of low-grade superficial TCC bladder dating back to 2011.  He had tumor February 2019 at time of TURP.  Most recent tumor was PUNLMP March 2022, bilateral RPG normal at that time.    Post operative diagnosis:  No recurrent bladder tumor    Procedure:  The patient was prepped and draped in a normal sterile fashion.  The urethra was anesthetized with 2% lidocaine jelly.  A flexible cystoscope was introduced per urethra.      Urethra:  Normal    Bladder:  Normal mucosa, No bladder tumor and mild trabeculation    Ureteral orifices:  Normal position bilaterally and Clear efflux bilaterally    Prostate:  Well resected prostatic urethra     Patient tolerated the procedure well    Complications: none    Blood loss: minimal    Follow up:    Routine follow up-1 year for surveillance cystoscopy or sooner as needed        This document has been signed by JOANNA Romeo MD on April 3, 2023 13:09 CDT

## (undated) DEVICE — CATH URETRL OPN/END 5F70CM

## (undated) DEVICE — PLUG,CATHETER,DRAINAGE PROTECTOR,TUBE: Brand: MEDLINE

## (undated) DEVICE — PK TURNOVER CYSTO RM

## (undated) DEVICE — PK CYSTO 30

## (undated) DEVICE — CUTTING ELECTRODE MONO 24FR 12/30°  FOR RESECTOSCOPES, TELESCOPE Ø 4 MM, FOR SHEATHS, INTERMITTENT/CONTINUOUS IRRIGATION, 24/26 FR, LOOP: ROUND, WIRE Ø 0.25 MM, FORK COLOR YELLOW, STEM COLOR TRANSPARENT, PACK = 10 PCS, FOR SHARK RESECTOSCOPE, STERILE, FOR SINGLE USE: Brand: SHARK

## (undated) DEVICE — GLV SURG BIOGEL M LTX PF 7 1/2

## (undated) DEVICE — SYRINGE,PISTON,IRRIGATION,60ML,STERILE: Brand: MEDLINE